# Patient Record
Sex: MALE | Race: WHITE | URBAN - METROPOLITAN AREA
[De-identification: names, ages, dates, MRNs, and addresses within clinical notes are randomized per-mention and may not be internally consistent; named-entity substitution may affect disease eponyms.]

---

## 2017-01-22 DIAGNOSIS — F90.2 ATTENTION DEFICIT HYPERACTIVITY DISORDER (ADHD), COMBINED TYPE: Primary | ICD-10-CM

## 2017-01-23 RX ORDER — METHYLPHENIDATE HYDROCHLORIDE 36 MG/1
TABLET ORAL
Qty: 60 TABLET | Refills: 0 | Status: SHIPPED | OUTPATIENT
Start: 2017-01-23 | End: 2017-03-15

## 2017-01-23 RX ORDER — METHYLPHENIDATE HYDROCHLORIDE 36 MG/1
TABLET ORAL
Qty: 60 TABLET | Refills: 0 | Status: SHIPPED | OUTPATIENT
Start: 2017-01-23 | End: 2017-04-04

## 2017-01-23 RX ORDER — METHYLPHENIDATE HYDROCHLORIDE 10 MG/1
TABLET ORAL
Qty: 60 TABLET | Refills: 0 | Status: SHIPPED | OUTPATIENT
Start: 2017-01-23 | End: 2017-03-15

## 2017-01-23 RX ORDER — METHYLPHENIDATE HYDROCHLORIDE 10 MG/1
TABLET ORAL
Qty: 60 TABLET | Refills: 0 | Status: SHIPPED | OUTPATIENT
Start: 2017-01-23 | End: 2017-04-04

## 2017-03-15 DIAGNOSIS — F90.2 ATTENTION DEFICIT HYPERACTIVITY DISORDER (ADHD), COMBINED TYPE: ICD-10-CM

## 2017-03-15 RX ORDER — METHYLPHENIDATE HYDROCHLORIDE 36 MG/1
TABLET ORAL
Qty: 60 TABLET | Refills: 0 | Status: SHIPPED | OUTPATIENT
Start: 2017-03-15 | End: 2017-05-01

## 2017-03-15 RX ORDER — METHYLPHENIDATE HYDROCHLORIDE 10 MG/1
TABLET ORAL
Qty: 60 TABLET | Refills: 0 | Status: SHIPPED | OUTPATIENT
Start: 2017-03-15 | End: 2017-05-01

## 2017-04-04 DIAGNOSIS — F90.2 ATTENTION DEFICIT HYPERACTIVITY DISORDER (ADHD), COMBINED TYPE: ICD-10-CM

## 2017-04-05 RX ORDER — METHYLPHENIDATE HYDROCHLORIDE 36 MG/1
TABLET ORAL
Qty: 60 TABLET | Refills: 0 | Status: SHIPPED | OUTPATIENT
Start: 2017-04-05 | End: 2017-05-24

## 2017-04-05 RX ORDER — METHYLPHENIDATE HYDROCHLORIDE 10 MG/1
TABLET ORAL
Qty: 60 TABLET | Refills: 0 | Status: SHIPPED | OUTPATIENT
Start: 2017-04-05 | End: 2017-05-24

## 2017-04-10 ENCOUNTER — OFFICE VISIT (OUTPATIENT)
Dept: PEDIATRICS | Facility: CLINIC | Age: 22
End: 2017-04-10
Attending: PEDIATRICS
Payer: COMMERCIAL

## 2017-04-10 VITALS
SYSTOLIC BLOOD PRESSURE: 130 MMHG | DIASTOLIC BLOOD PRESSURE: 86 MMHG | HEART RATE: 62 BPM | WEIGHT: 116.18 LBS | BODY MASS INDEX: 16.63 KG/M2 | HEIGHT: 70 IN

## 2017-04-10 DIAGNOSIS — F81.9 LEARNING DISABILITY: ICD-10-CM

## 2017-04-10 DIAGNOSIS — F90.2 ATTENTION DEFICIT HYPERACTIVITY DISORDER (ADHD), COMBINED TYPE: Primary | ICD-10-CM

## 2017-04-10 PROCEDURE — 99212 OFFICE O/P EST SF 10 MIN: CPT | Mod: ZF

## 2017-04-10 ASSESSMENT — PAIN SCALES - GENERAL: PAINLEVEL: NO PAIN (0)

## 2017-04-10 NOTE — MR AVS SNAPSHOT
After Visit Summary   4/10/2017    Damián Perez    MRN: [id number]           Patient Information     Date Of Birth          1995        Visit Information        Provider Department      4/10/2017 2:20 PM Bryson Gr MD [facility]        Today's Diagnoses     Attention deficit hyperactivity disorder (ADHD), combined type    -  1    Learning disability          Care Instructions    Schedule a return appointment in 4-6 months    Return scheduling phone number:  [phone]    Brittney Mcguire RN:  [phone]  Clinic Care Coordinator    After hours emergency phone number:  [phone] Ask to have Dr. Gr called              Follow-ups after your visit        Who to contact     Please call your clinic at [phone] to:    Ask questions about your health    Make or cancel appointments    Discuss your medicines    Learn about your test results    Speak to your doctor   If you have compliments or concerns about an experience at your clinic, or if you wish to file a complaint, please contact [facility] Patient Relations at [phone] or email us at [email or web address]         Additional Information About Your Visit        [employer] Information     [employer] is an electronic gateway that provides easy, online access to your medical records. With [employer], you can request a clinic appointment, read your test results, renew a prescription or communicate with your care team.     To sign up for [employer] visit the website at [email or web address]   You will be asked to enter the access code listed below, as well as some personal information. Please follow the directions to create your username and password.     Your access code is: [email or web address]  Expires: 7/10/2017  9:05 AM     Your access code will  in 90 days. If you need help or a new code, please contact your [facility] or call [phone] for  "assistance.        Care EveryWhere ID     This is your Care EveryWhere ID. This could be used by other organizations to access your [place] medical records  [id number]        Your Vitals Were     Pulse Height BMI (Body Mass Index)             62 5' 10.28\" (178.5 cm) 16.54 kg/m2          Blood Pressure from Last 3 Encounters:   04/10/17 130/86   04/20/16 121/76   08/17/15 126/57    Weight from Last 3 Encounters:   04/10/17 116 lb 2.9 oz (52.7 kg) (2 %)*   04/20/16 126 lb 8.7 oz (57.4 kg) (11 %)*   08/17/15 129 lb 6.6 oz (58.7 kg) (17 %)*     * Growth percentiles are based on [facility] 2-20 Years data.              Today, you had the following     No orders found for display       Primary Care Provider Office Phone # Fax #    Ena Geoffrey Murguia [phone]       [facility] [address] [facility] [address]  [facility] [address]        Thank you!     Thank you for choosing [facility]  for your care. Our goal is always to provide you with excellent care. Hearing back from our patients is one way we can continue to improve our services. Please take a few minutes to complete the written survey that you may receive in the mail after your visit with us. Thank you!             Your Updated Medication List - Protect others around you: Learn how to safely use, store and throw away your medicines at www.disposemymeds.org.          This list is accurate as of: 4/10/17 11:59 PM.  Always use your most recent med list.                   Brand Name Dispense Instructions for use    * methylphenidate ER 36 MG CR tablet    CONCERTA    60 tablet    Take 2 in am       * methylphenidate 10 MG tablet    RITALIN    60 tablet    Take 1 and 1/2 tabs in late afternoon       * methylphenidate ER 36 MG CR tablet    CONCERTA    60 tablet    TAKE 2 IN AM       * methylphenidate 10 MG tablet    RITALIN    60 tablet    TAKE 1 AND 1/2 TABLETS  IN AFTERNOON       * Notice:  This list has 4 medication(s) that are the " same as other medications prescribed for you. Read the directions carefully, and ask your doctor or other care provider to review them with you.

## 2017-04-10 NOTE — PATIENT INSTRUCTIONS
Schedule a return appointment in 4-6 months    Return scheduling phone number:  [phone]    Brittney Mcguire RN:  [phone]  Clinic Care Coordinator    After hours emergency phone number:  [phone] Ask to have Dr. Gr called

## 2017-04-10 NOTE — PROGRESS NOTES
"I met with Damián and part of the time with Damián and his mother today.      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  He next year will go to a special program at [employer] [facility] [employer] in [place].      The main issues at this point are some of Damián's struggles with his parents.  One is over weight which has dropped considerably.  Another is over hygiene.  His mother did bring these things up and then we talked independently.  The main focus of the discussion was for Damián to take charge himself of these issues instead of using them as points of contention and struggle at home.  Damián is working on these things with Dr. Thai Vásquez and I pointed out that that is the most appropriate place to deal with those things.  We spent considerable time on these two issues.  Damián remains taking Concerta 72 mg in the morning and 15 mg in the afternoon.  This seems to be effective without side effects.      PHYSICAL FINDINGS:  I reviewed Damián's growth with height now 59th percentile and weight 2nd percentile.  Blood pressure 130/86.  Heart rate 62.      ASSESSMENT/PLAN:    Assessment remains ADHD and learning disabilities with considerable adolescent individuation issues.  I will see him back in 4-6 months.        Over half of this 40-minute visit was used for counseling and care management.      Bryson Gr MD       "

## 2017-04-10 NOTE — LETTER
"4/10/2017      RE: Damián Perez  [address]  [place] [facility] [address]     Dear Colleague,    Thank you for the opportunity to participate in the care of your patient, Damián Perez, at the [facility] at [facility]. Please see a copy of my visit note below.    I met with Damián and part of the time with Damián and his mother today.      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  He next year will go to a special program at [employer] in [place].      The main issues at this point are some of Damián's struggles with his parents.  One is over weight which has dropped considerably.  Another is over hygiene.  His mother did bring these things up and then we talked independently.  The main focus of the discussion was for Damián to take charge himself of these issues instead of using them as points of contention and struggle at home.  Damián is working on these things with Dr. Thai Vásquez and I pointed out that that is the most appropriate place to deal with those things.  We spent considerable time on these two issues.  Damián remains taking Concerta 72 mg in the morning and 15 mg in the afternoon.  This seems to be effective without side effects.      PHYSICAL FINDINGS:  I reviewed Damián's growth with height now 59th percentile and weight 2nd percentile.  Blood pressure 130/86.  Heart rate 62.      ASSESSMENT/PLAN:    Assessment remains ADHD and learning disabilities with considerable adolescent individuation issues.  I will see him back in 4-6 months.        Over half of this 40-minute visit was used for counseling and care management.      Bryson Gr MD       "

## 2017-04-10 NOTE — NURSING NOTE
"Chief Complaint   Patient presents with     Eval/Assessment     follow up for adhd     Accompanied to apt by mother  , Ht, Wt, VS obtained.  Medication documented, Pharmacy noted  /86  Pulse 62  Ht 5' 10.28\" (178.5 cm)  Wt 116 lb 2.9 oz (52.7 kg)  BMI 16.54 kg/m2      "

## 2017-05-01 DIAGNOSIS — F90.2 ATTENTION DEFICIT HYPERACTIVITY DISORDER (ADHD), COMBINED TYPE: ICD-10-CM

## 2017-05-02 RX ORDER — METHYLPHENIDATE HYDROCHLORIDE 10 MG/1
TABLET ORAL
Qty: 60 TABLET | Refills: 0 | Status: SHIPPED | OUTPATIENT
Start: 2017-05-02 | End: 2017-06-12

## 2017-05-02 RX ORDER — METHYLPHENIDATE HYDROCHLORIDE 36 MG/1
TABLET ORAL
Qty: 60 TABLET | Refills: 0 | Status: SHIPPED | OUTPATIENT
Start: 2017-05-02 | End: 2017-06-12

## 2017-05-24 DIAGNOSIS — F90.2 ATTENTION DEFICIT HYPERACTIVITY DISORDER (ADHD), COMBINED TYPE: ICD-10-CM

## 2017-05-24 RX ORDER — METHYLPHENIDATE HYDROCHLORIDE 36 MG/1
TABLET ORAL
Qty: 60 TABLET | Refills: 0 | Status: SHIPPED | OUTPATIENT
Start: 2017-05-24 | End: 2017-07-09

## 2017-05-24 RX ORDER — METHYLPHENIDATE HYDROCHLORIDE 10 MG/1
TABLET ORAL
Qty: 60 TABLET | Refills: 0 | Status: SHIPPED | OUTPATIENT
Start: 2017-05-24 | End: 2017-07-09

## 2017-06-12 DIAGNOSIS — F90.2 ATTENTION DEFICIT HYPERACTIVITY DISORDER (ADHD), COMBINED TYPE: ICD-10-CM

## 2017-06-12 RX ORDER — METHYLPHENIDATE HYDROCHLORIDE 10 MG/1
TABLET ORAL
Qty: 60 TABLET | Refills: 0 | Status: SHIPPED | OUTPATIENT
Start: 2017-06-12 | End: 2017-08-06

## 2017-06-12 RX ORDER — METHYLPHENIDATE HYDROCHLORIDE 36 MG/1
TABLET ORAL
Qty: 60 TABLET | Refills: 0 | Status: SHIPPED | OUTPATIENT
Start: 2017-06-12 | End: 2017-08-06

## 2017-07-09 DIAGNOSIS — F90.2 ATTENTION DEFICIT HYPERACTIVITY DISORDER (ADHD), COMBINED TYPE: ICD-10-CM

## 2017-07-10 RX ORDER — METHYLPHENIDATE HYDROCHLORIDE 36 MG/1
TABLET ORAL
Qty: 60 TABLET | Refills: 0 | Status: SHIPPED | OUTPATIENT
Start: 2017-07-10 | End: 2017-08-06

## 2017-07-10 RX ORDER — METHYLPHENIDATE HYDROCHLORIDE 10 MG/1
TABLET ORAL
Qty: 60 TABLET | Refills: 0 | Status: SHIPPED | OUTPATIENT
Start: 2017-07-10 | End: 2017-08-06

## 2017-08-06 DIAGNOSIS — F90.2 ATTENTION DEFICIT HYPERACTIVITY DISORDER (ADHD), COMBINED TYPE: ICD-10-CM

## 2017-08-07 RX ORDER — METHYLPHENIDATE HYDROCHLORIDE 10 MG/1
TABLET ORAL
Qty: 60 TABLET | Refills: 0 | Status: SHIPPED | OUTPATIENT
Start: 2017-08-07 | End: 2017-09-21

## 2017-08-07 RX ORDER — METHYLPHENIDATE HYDROCHLORIDE 36 MG/1
TABLET ORAL
Qty: 60 TABLET | Refills: 0 | Status: SHIPPED | OUTPATIENT
Start: 2017-08-07 | End: 2017-09-21

## 2017-09-21 DIAGNOSIS — F90.2 ATTENTION DEFICIT HYPERACTIVITY DISORDER (ADHD), COMBINED TYPE: ICD-10-CM

## 2017-09-21 RX ORDER — METHYLPHENIDATE HYDROCHLORIDE 36 MG/1
TABLET ORAL
Qty: 60 TABLET | Refills: 0 | Status: SHIPPED | OUTPATIENT
Start: 2017-09-21 | End: 2017-11-08

## 2017-09-21 RX ORDER — METHYLPHENIDATE HYDROCHLORIDE 10 MG/1
TABLET ORAL
Qty: 60 TABLET | Refills: 0 | Status: SHIPPED | OUTPATIENT
Start: 2017-09-21 | End: 2017-11-08

## 2017-11-08 DIAGNOSIS — F90.2 ATTENTION DEFICIT HYPERACTIVITY DISORDER (ADHD), COMBINED TYPE: ICD-10-CM

## 2017-11-08 RX ORDER — METHYLPHENIDATE HYDROCHLORIDE 10 MG/1
TABLET ORAL
Qty: 60 TABLET | Refills: 0 | Status: SHIPPED | OUTPATIENT
Start: 2017-11-08 | End: 2017-12-18

## 2017-11-08 RX ORDER — METHYLPHENIDATE HYDROCHLORIDE 36 MG/1
TABLET ORAL
Qty: 60 TABLET | Refills: 0 | Status: SHIPPED | OUTPATIENT
Start: 2017-11-08 | End: 2017-12-18

## 2017-12-18 DIAGNOSIS — F90.2 ATTENTION DEFICIT HYPERACTIVITY DISORDER (ADHD), COMBINED TYPE: ICD-10-CM

## 2017-12-19 RX ORDER — METHYLPHENIDATE HYDROCHLORIDE 36 MG/1
TABLET ORAL
Qty: 60 TABLET | Refills: 0 | Status: SHIPPED | OUTPATIENT
Start: 2017-12-19 | End: 2018-01-30

## 2017-12-19 RX ORDER — METHYLPHENIDATE HYDROCHLORIDE 10 MG/1
TABLET ORAL
Qty: 60 TABLET | Refills: 0 | Status: SHIPPED | OUTPATIENT
Start: 2017-12-19 | End: 2018-01-30

## 2018-01-30 DIAGNOSIS — F90.2 ATTENTION DEFICIT HYPERACTIVITY DISORDER (ADHD), COMBINED TYPE: ICD-10-CM

## 2018-01-31 RX ORDER — METHYLPHENIDATE HYDROCHLORIDE 10 MG/1
TABLET ORAL
Qty: 60 TABLET | Refills: 0 | Status: SHIPPED | OUTPATIENT
Start: 2018-01-31

## 2018-01-31 RX ORDER — METHYLPHENIDATE HYDROCHLORIDE 36 MG/1
TABLET ORAL
Qty: 60 TABLET | Refills: 0 | Status: SHIPPED | OUTPATIENT
Start: 2018-01-31

## 2019-04-24 ENCOUNTER — OFFICE VISIT (OUTPATIENT)
Dept: OCCUPATIONAL MEDICINE | Age: 24
End: 2019-04-24

## 2019-04-24 DIAGNOSIS — Z00.8 HEALTH EXAMINATION IN POPULATION SURVEY: Primary | ICD-10-CM

## 2019-04-24 PROCEDURE — OH123 RAPID TEST DRUG KIT & COLLECTION 5 PANEL: Performed by: PREVENTIVE MEDICINE

## 2020-04-29 ENCOUNTER — WALK IN (OUTPATIENT)
Dept: URGENT CARE | Age: 25
End: 2020-04-29

## 2020-04-29 VITALS
HEART RATE: 68 BPM | TEMPERATURE: 98.2 F | SYSTOLIC BLOOD PRESSURE: 112 MMHG | WEIGHT: 130 LBS | RESPIRATION RATE: 20 BRPM | DIASTOLIC BLOOD PRESSURE: 70 MMHG

## 2020-04-29 DIAGNOSIS — H92.01 RIGHT EAR PAIN: Primary | ICD-10-CM

## 2020-04-29 PROCEDURE — 99202 OFFICE O/P NEW SF 15 MIN: CPT | Performed by: PHYSICIAN ASSISTANT

## 2020-04-29 RX ORDER — METHYLPHENIDATE HYDROCHLORIDE 36 MG/1
36 TABLET ORAL 2 TIMES DAILY
COMMUNITY

## 2020-04-29 ASSESSMENT — ENCOUNTER SYMPTOMS
HEADACHES: 0
FEVER: 0
ADENOPATHY: 0
SORE THROAT: 0
CHILLS: 0
COUGH: 0

## 2020-07-27 ENCOUNTER — WORKER'S COMP (OUTPATIENT)
Dept: URGENT CARE | Age: 25
End: 2020-07-27

## 2020-07-27 VITALS
HEART RATE: 78 BPM | BODY MASS INDEX: 16.99 KG/M2 | RESPIRATION RATE: 14 BRPM | OXYGEN SATURATION: 98 % | DIASTOLIC BLOOD PRESSURE: 78 MMHG | TEMPERATURE: 98.7 F | HEIGHT: 73 IN | WEIGHT: 128.2 LBS | SYSTOLIC BLOOD PRESSURE: 117 MMHG

## 2020-07-27 DIAGNOSIS — S61.216A LACERATION OF RIGHT LITTLE FINGER WITHOUT FOREIGN BODY WITHOUT DAMAGE TO NAIL, INITIAL ENCOUNTER: Primary | ICD-10-CM

## 2020-07-27 PROCEDURE — 12001 RPR S/N/AX/GEN/TRNK 2.5CM/<: CPT | Performed by: INTERNAL MEDICINE

## 2020-08-05 ENCOUNTER — WORKER'S COMP (OUTPATIENT)
Dept: URGENT CARE | Age: 25
End: 2020-08-05

## 2020-08-05 VITALS
DIASTOLIC BLOOD PRESSURE: 74 MMHG | HEART RATE: 71 BPM | SYSTOLIC BLOOD PRESSURE: 122 MMHG | OXYGEN SATURATION: 100 % | TEMPERATURE: 97.8 F

## 2020-08-05 DIAGNOSIS — Z48.02 VISIT FOR SUTURE REMOVAL: Primary | ICD-10-CM

## 2020-08-05 PROCEDURE — 99212 OFFICE O/P EST SF 10 MIN: CPT | Performed by: PHYSICIAN ASSISTANT

## 2020-08-05 ASSESSMENT — ENCOUNTER SYMPTOMS
SHORTNESS OF BREATH: 0
COUGH: 0
COLOR CHANGE: 0
FEVER: 0
CHILLS: 0
WOUND: 1